# Patient Record
Sex: MALE | Race: WHITE | NOT HISPANIC OR LATINO | Employment: FULL TIME | URBAN - METROPOLITAN AREA
[De-identification: names, ages, dates, MRNs, and addresses within clinical notes are randomized per-mention and may not be internally consistent; named-entity substitution may affect disease eponyms.]

---

## 2023-10-03 ENCOUNTER — TRANSCRIBE ORDERS (OUTPATIENT)
Dept: CARDIOLOGY | Facility: CLINIC | Age: 58
End: 2023-10-03
Payer: COMMERCIAL

## 2023-10-03 DIAGNOSIS — I73.9 PAD (PERIPHERAL ARTERY DISEASE) (CMS-HCC): Primary | ICD-10-CM

## 2023-10-20 ENCOUNTER — LAB (OUTPATIENT)
Dept: LAB | Facility: LAB | Age: 58
End: 2023-10-20
Payer: COMMERCIAL

## 2023-10-20 DIAGNOSIS — I73.9 PAD (PERIPHERAL ARTERY DISEASE) (CMS-HCC): ICD-10-CM

## 2023-10-20 PROCEDURE — 80048 BASIC METABOLIC PNL TOTAL CA: CPT

## 2023-10-20 PROCEDURE — 36415 COLL VENOUS BLD VENIPUNCTURE: CPT

## 2023-10-21 LAB
ANION GAP SERPL CALC-SCNC: 11 MMOL/L (ref 10–20)
BUN SERPL-MCNC: 26 MG/DL (ref 6–23)
CALCIUM SERPL-MCNC: 9.6 MG/DL (ref 8.6–10.6)
CHLORIDE SERPL-SCNC: 104 MMOL/L (ref 98–107)
CO2 SERPL-SCNC: 27 MMOL/L (ref 21–32)
CREAT SERPL-MCNC: 1.01 MG/DL (ref 0.5–1.3)
GFR SERPL CREATININE-BSD FRML MDRD: 85 ML/MIN/1.73M*2
GLUCOSE SERPL-MCNC: 85 MG/DL (ref 74–99)
POTASSIUM SERPL-SCNC: 4.1 MMOL/L (ref 3.5–5.3)
SODIUM SERPL-SCNC: 138 MMOL/L (ref 136–145)

## 2023-11-03 ENCOUNTER — APPOINTMENT (OUTPATIENT)
Dept: RADIOLOGY | Facility: CLINIC | Age: 58
End: 2023-11-03
Payer: COMMERCIAL

## 2023-11-07 ENCOUNTER — APPOINTMENT (OUTPATIENT)
Dept: CARDIOLOGY | Facility: CLINIC | Age: 58
End: 2023-11-07
Payer: COMMERCIAL

## 2023-11-10 ENCOUNTER — ANCILLARY PROCEDURE (OUTPATIENT)
Dept: RADIOLOGY | Facility: CLINIC | Age: 58
End: 2023-11-10
Payer: COMMERCIAL

## 2023-11-10 DIAGNOSIS — I71.00 DISSECTION OF UNSPECIFIED SITE OF AORTA (MULTI): ICD-10-CM

## 2023-11-10 PROCEDURE — 2550000001 HC RX 255 CONTRASTS: Performed by: INTERNAL MEDICINE

## 2023-11-10 PROCEDURE — 74174 CTA ABD&PLVS W/CONTRAST: CPT | Performed by: STUDENT IN AN ORGANIZED HEALTH CARE EDUCATION/TRAINING PROGRAM

## 2023-11-10 PROCEDURE — 71275 CT ANGIOGRAPHY CHEST: CPT

## 2023-11-10 PROCEDURE — 71275 CT ANGIOGRAPHY CHEST: CPT | Performed by: STUDENT IN AN ORGANIZED HEALTH CARE EDUCATION/TRAINING PROGRAM

## 2023-11-10 RX ADMIN — IOHEXOL 70 ML: 350 INJECTION, SOLUTION INTRAVENOUS at 13:19

## 2023-11-12 PROBLEM — I69.351 HEMIPLEGIA AND HEMIPARESIS FOLLOWING CEREBRAL INFARCTION AFFECTING RIGHT DOMINANT SIDE (MULTI): Status: ACTIVE | Noted: 2022-11-11

## 2023-11-12 PROBLEM — E83.42 HYPOMAGNESEMIA: Status: ACTIVE | Noted: 2022-11-11

## 2023-11-12 PROBLEM — I82.C11: Status: ACTIVE | Noted: 2022-11-11

## 2023-11-12 PROBLEM — R20.0 RIGHT LEG NUMBNESS: Status: ACTIVE | Noted: 2023-11-12

## 2023-11-12 PROBLEM — I10 HYPERTENSION: Status: ACTIVE | Noted: 2018-09-18

## 2023-11-12 PROBLEM — B35.1 DERMATOPHYTOSIS OF NAIL: Status: ACTIVE | Noted: 2018-09-18

## 2023-11-12 PROBLEM — I77.71 CAROTID ARTERY DISSECTION (MULTI): Status: ACTIVE | Noted: 2023-11-12

## 2023-11-12 PROBLEM — M62.81 MUSCLE WEAKNESS (GENERALIZED): Status: ACTIVE | Noted: 2022-11-11

## 2023-11-12 PROBLEM — I71.010: Status: ACTIVE | Noted: 2023-02-09

## 2023-11-12 PROBLEM — K21.9 GASTRO-ESOPHAGEAL REFLUX DISEASE WITHOUT ESOPHAGITIS: Status: ACTIVE | Noted: 2022-11-11

## 2023-11-12 PROBLEM — R48.8 OTHER SYMBOLIC DYSFUNCTIONS: Status: ACTIVE | Noted: 2022-11-11

## 2023-11-12 PROBLEM — Z95.828 S/P ASCENDING AORTIC REPLACEMENT: Status: ACTIVE | Noted: 2023-11-12

## 2023-11-12 PROBLEM — I63.40 CEREBROVASCULAR ACCIDENT (CVA) DUE TO EMBOLISM OF CEREBRAL ARTERY (MULTI): Status: ACTIVE | Noted: 2023-02-09

## 2023-11-12 PROBLEM — M79.89 OTHER SPECIFIED SOFT TISSUE DISORDERS: Status: ACTIVE | Noted: 2022-11-11

## 2023-11-12 PROBLEM — R26.2 DIFFICULTY IN WALKING, NOT ELSEWHERE CLASSIFIED: Status: ACTIVE | Noted: 2022-11-11

## 2023-11-12 PROBLEM — I48.91 A-FIB (MULTI): Status: ACTIVE | Noted: 2023-11-12

## 2023-11-12 PROBLEM — J98.6 DISORDERS OF DIAPHRAGM: Status: ACTIVE | Noted: 2022-11-11

## 2023-11-12 PROBLEM — M75.42 SHOULDER IMPINGEMENT SYNDROME, LEFT: Status: ACTIVE | Noted: 2021-11-22

## 2023-11-12 PROBLEM — E78.5 HYPERLIPIDEMIA, UNSPECIFIED: Status: ACTIVE | Noted: 2022-11-11

## 2023-11-12 RX ORDER — METOPROLOL TARTRATE 25 MG/1
25 TABLET, FILM COATED ORAL EVERY 12 HOURS
COMMUNITY
End: 2023-12-20 | Stop reason: ALTCHOICE

## 2023-11-12 RX ORDER — AMLODIPINE BESYLATE 10 MG/1
10 TABLET ORAL NIGHTLY
COMMUNITY

## 2023-11-12 RX ORDER — LIDOCAINE 50 MG/G
1 PATCH TOPICAL
COMMUNITY
End: 2023-12-20 | Stop reason: ALTCHOICE

## 2023-11-12 RX ORDER — MELATONIN 5 MG
CAPSULE ORAL
COMMUNITY
End: 2023-12-20 | Stop reason: ALTCHOICE

## 2023-11-12 RX ORDER — POLYETHYLENE GLYCOL 3350 17 G/17G
17 POWDER, FOR SOLUTION ORAL DAILY PRN
COMMUNITY
Start: 2022-11-04 | End: 2023-12-20 | Stop reason: ALTCHOICE

## 2023-11-12 RX ORDER — BISOPROLOL FUMARATE 10 MG/1
10 TABLET, FILM COATED ORAL DAILY
COMMUNITY

## 2023-11-12 RX ORDER — PANTOPRAZOLE SODIUM 40 MG/1
40 TABLET, DELAYED RELEASE ORAL
COMMUNITY
Start: 2023-01-03 | End: 2023-12-20 | Stop reason: ALTCHOICE

## 2023-11-12 RX ORDER — AMLODIPINE BESYLATE 5 MG/1
5 TABLET ORAL
COMMUNITY
Start: 2022-02-25 | End: 2023-12-20 | Stop reason: ALTCHOICE

## 2023-11-12 RX ORDER — ATORVASTATIN CALCIUM 40 MG/1
40 TABLET, FILM COATED ORAL NIGHTLY
COMMUNITY
End: 2023-12-20 | Stop reason: ALTCHOICE

## 2023-11-12 RX ORDER — ASPIRIN 81 MG/1
1 TABLET ORAL DAILY
COMMUNITY
Start: 2022-11-04

## 2023-11-12 RX ORDER — ACETAMINOPHEN, DIPHENHYDRAMINE HCL, PHENYLEPHRINE HCL 325; 25; 5 MG/1; MG/1; MG/1
1 TABLET ORAL DAILY
COMMUNITY
Start: 2022-11-04 | End: 2023-12-20 | Stop reason: ALTCHOICE

## 2023-11-12 RX ORDER — MULTIVIT-MIN/IRON FUM/FOLIC AC 7.5 MG-4
1 TABLET ORAL DAILY
COMMUNITY
Start: 2022-11-29

## 2023-11-12 RX ORDER — ACETAMINOPHEN 325 MG/1
1-2 TABLET ORAL EVERY 6 HOURS PRN
COMMUNITY
Start: 2022-11-04 | End: 2023-12-20 | Stop reason: ALTCHOICE

## 2023-11-12 RX ORDER — LANOLIN ALCOHOL/MO/W.PET/CERES
400 CREAM (GRAM) TOPICAL
COMMUNITY
End: 2023-12-20 | Stop reason: ALTCHOICE

## 2023-11-12 RX ORDER — ATORVASTATIN CALCIUM 20 MG/1
20 TABLET, FILM COATED ORAL
COMMUNITY
Start: 2023-08-04

## 2023-11-13 NOTE — PROGRESS NOTES
Subjective   Patient ID: Gadiel Salcedo is a 58 y.o. male who presents for Follow-up (CTA review).    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 His recent CTA of chest/abd/pelvis was unremarkable. MRI of the spine showed degenerative changes,but otherwise unremarkable.      He was on Xarelto 20mg p.o BID along with baby aspirin. Xarelto was discontinued by his cardiac surgeon and now he is on baby aspirin only.      Patient kidney function is intact, patient denies any history of abdominal angina, no history of claudication yet he reports leg pain and numbness in both lower extremities sometimes, patient denies any history of lateralization no history of focal neurological signs or symptoms, patient's blood pressure and pulse is to goal. He is on Bisoprolol 10mg p.o once a day now. No abdominal pain, his appetite is good. Denies any leg pain and gait problems. No hx of bleeding in the past.     PVR: No evidence of arterial occlusive disease in the bilateral lower extremity at rest and with exercise. Normal digital perfusion noted. Triphasic flow noted.    Review of Systems   Constitutional: Negative.    HENT: Negative.     Eyes: Negative.    Respiratory: Negative.     Cardiovascular:  Negative for leg swelling.   Gastrointestinal: Negative.    Endocrine: Negative.    Musculoskeletal:  Negative for gait problem.   Skin: Negative.    Allergic/Immunologic: Negative.    Neurological:  Positive for numbness.   Hematological: Negative.    Psychiatric/Behavioral: Negative.     All other systems reviewed and are negative.      Objective   /60   Pulse 56   Ht 1.829 m (6')   Wt 77.6 kg (171 lb)   BMI 23.19 kg/m²     Physical Exam  Vitals reviewed.   Constitutional:       Appearance: Normal appearance.   HENT:      Head: Normocephalic and  atraumatic.   Cardiovascular:      Rate and Rhythm: Normal rate and regular rhythm.      Pulses: Normal pulses.      Heart sounds: Normal heart sounds.   Pulmonary:      Effort: Pulmonary effort is normal.      Breath sounds: Normal breath sounds.   Skin:     General: Skin is warm.   Neurological:      General: No focal deficit present.      Mental Status: He is alert and oriented to person, place, and time.   Psychiatric:         Mood and Affect: Mood normal.         Behavior: Behavior normal.         Assessment/Plan   57 year old male with hx of carotid artery dissection secondary to left IJ clot, now s/p ascending aortic replacement following up with left ankle swelling, lower extremity numbness and weakness. Patient has discontinued Xarelto and is currently on baby aspirin and bisoprolol 10mg. Patient's blood pressure and pulse is to goal. PVR came back negative and CTA of chest/abd/pelvis is stable. Pt's sx have improved and he is doing well.      1- Discussed being on Plavix along with aspirin 81mg. Patient will discuss this with his neurologist.,  Patient is to continue with the same current medications patient reduced his atorvastatin dose from 40 to 20 mg because he feels that this is too much and he likes to be on lesser amount and dosage of medications as much as possible  2- Pt is encouraged to be more mobile and walk more.   3- We discussed vascular risk factor modifications: HbA1C <7%, LDL<70 and /80 or less which he is achieving a blood pressure of 126/65 and pulse of 56 bpm  4- patient is to report any signs or symptoms suggestive of abdominal angina, change in bowel habits, claudication or any other symptoms like abdominal pain, leg pain and back pain  5- Follow up in 6 months for re-assessment with CTA of chest/ abdomen and pelvis prior.     Scribe Attestation  By signing my name below, INoemi , Scribwing   attest that this documentation has been prepared under the direction and in  the presence of Daniel Brar MD.

## 2023-11-14 ENCOUNTER — OFFICE VISIT (OUTPATIENT)
Dept: CARDIOLOGY | Facility: CLINIC | Age: 58
End: 2023-11-14
Payer: COMMERCIAL

## 2023-11-14 VITALS
BODY MASS INDEX: 23.16 KG/M2 | SYSTOLIC BLOOD PRESSURE: 123 MMHG | HEIGHT: 72 IN | DIASTOLIC BLOOD PRESSURE: 60 MMHG | HEART RATE: 56 BPM | WEIGHT: 171 LBS

## 2023-11-14 DIAGNOSIS — Z95.828 S/P ASCENDING AORTIC REPLACEMENT: ICD-10-CM

## 2023-11-14 DIAGNOSIS — I82.C11 ACUTE EMBOLISM AND THROMBOSIS OF RIGHT INTERNAL JUGULAR VEIN (MULTI): ICD-10-CM

## 2023-11-14 DIAGNOSIS — I71.010 ASCENDING AORTIC DISSECTION (MULTI): Primary | ICD-10-CM

## 2023-11-14 PROCEDURE — 99214 OFFICE O/P EST MOD 30 MIN: CPT | Performed by: INTERNAL MEDICINE

## 2023-11-14 PROCEDURE — 3078F DIAST BP <80 MM HG: CPT | Performed by: INTERNAL MEDICINE

## 2023-11-14 PROCEDURE — 3074F SYST BP LT 130 MM HG: CPT | Performed by: INTERNAL MEDICINE

## 2023-11-14 ASSESSMENT — ENCOUNTER SYMPTOMS
CONSTITUTIONAL NEGATIVE: 1
RESPIRATORY NEGATIVE: 1
ALLERGIC/IMMUNOLOGIC NEGATIVE: 1
NUMBNESS: 1
ENDOCRINE NEGATIVE: 1
GASTROINTESTINAL NEGATIVE: 1
HEMATOLOGIC/LYMPHATIC NEGATIVE: 1
PSYCHIATRIC NEGATIVE: 1
EYES NEGATIVE: 1

## 2023-12-12 ENCOUNTER — TELEPHONE (OUTPATIENT)
Dept: NEUROLOGY | Facility: CLINIC | Age: 58
End: 2023-12-12
Payer: COMMERCIAL

## 2023-12-20 ENCOUNTER — OFFICE VISIT (OUTPATIENT)
Dept: NEUROLOGY | Facility: CLINIC | Age: 58
End: 2023-12-20
Payer: COMMERCIAL

## 2023-12-20 VITALS
HEART RATE: 52 BPM | DIASTOLIC BLOOD PRESSURE: 71 MMHG | WEIGHT: 176.4 LBS | HEIGHT: 71 IN | SYSTOLIC BLOOD PRESSURE: 142 MMHG | TEMPERATURE: 97.1 F | BODY MASS INDEX: 24.69 KG/M2

## 2023-12-20 DIAGNOSIS — Z86.73 HISTORY OF STROKE: Primary | ICD-10-CM

## 2023-12-20 DIAGNOSIS — G81.91 RIGHT HEMIPARESIS (MULTI): ICD-10-CM

## 2023-12-20 PROCEDURE — 3078F DIAST BP <80 MM HG: CPT | Performed by: PSYCHIATRY & NEUROLOGY

## 2023-12-20 PROCEDURE — 1036F TOBACCO NON-USER: CPT | Performed by: PSYCHIATRY & NEUROLOGY

## 2023-12-20 PROCEDURE — 99213 OFFICE O/P EST LOW 20 MIN: CPT | Performed by: PSYCHIATRY & NEUROLOGY

## 2023-12-20 PROCEDURE — 3077F SYST BP >= 140 MM HG: CPT | Performed by: PSYCHIATRY & NEUROLOGY

## 2023-12-20 ASSESSMENT — LIFESTYLE VARIABLES
HOW OFTEN DO YOU HAVE A DRINK CONTAINING ALCOHOL: 2-4 TIMES A MONTH
HOW MANY STANDARD DRINKS CONTAINING ALCOHOL DO YOU HAVE ON A TYPICAL DAY: 1 OR 2
HOW OFTEN DO YOU HAVE SIX OR MORE DRINKS ON ONE OCCASION: NEVER
SKIP TO QUESTIONS 9-10: 1
AUDIT-C TOTAL SCORE: 2

## 2023-12-20 ASSESSMENT — PATIENT HEALTH QUESTIONNAIRE - PHQ9
1. LITTLE INTEREST OR PLEASURE IN DOING THINGS: NOT AT ALL
2. FEELING DOWN, DEPRESSED OR HOPELESS: NOT AT ALL
SUM OF ALL RESPONSES TO PHQ9 QUESTIONS 1 & 2: 0

## 2023-12-20 NOTE — PROGRESS NOTES
Chief Complaint: Watershed infarct    HPI  58 y.o. male presenting for follow up regarding watershed infarct.    Since the last visit, he has been stable.  He does note an improvement in numbness.  It is now just in the right toes.  He has noted some nerve pain in the right toes characterized by a sharp pain in the toes.   He does note some curling of his right toes.      The patient denies any double vision, loss of peripheral vision, slurred speech, difficulty getting the words out, facial droop, facial numbness, focal weakness, focal numbness, loss of coordination, or loss of balance.            Current Outpatient Medications:     amLODIPine (Norvasc) 10 mg tablet, Take 1 tablet (10 mg) by mouth once daily at bedtime., Disp: , Rfl:     aspirin 81 mg EC tablet, Take 1 tablet (81 mg) by mouth once daily., Disp: , Rfl:     atorvastatin (Lipitor) 20 mg tablet, Take 1 tablet (20 mg) by mouth once daily., Disp: , Rfl:     bisoprolol (Zebeta) 10 mg tablet, Take 1 tablet (10 mg) by mouth once daily., Disp: , Rfl:     multivitamin with minerals (multivit-min-iron fum-folic ac) tablet, Take 1 tablet by mouth once daily., Disp: , Rfl:       Objective:  Gen: NAD  Neuro:  --HIF: A&O X 3, repetition and naming intact  --CN:  PERRLA, EOMI, VFF, no visible facial asymmetry, facial sensation intact, no tongue or palatal deviation, SCM intact  --Motor: Moves all 4 extremities equally except the following:      RLE: hip flexion 4+  --Sensory: Intact to light touch, intact to pinprick  --Reflex: 2+ symmetric, toes down  --Cerebellum: FTN and HTS intact  --Gait: Normal, narrow based gait    Relevant Results      Assessment:    Watershed Infarcts  Right Sided Hemiparesis    Plan:  = continue ASA 81 mg/day  - follow up in 6 months    Yaron Maxwell MD  [facility]  Department of Neurology

## 2024-05-03 DIAGNOSIS — I71.010 ASCENDING AORTIC DISSECTION (MULTI): Primary | ICD-10-CM

## 2024-05-10 ENCOUNTER — LAB (OUTPATIENT)
Dept: LAB | Facility: LAB | Age: 59
End: 2024-05-10
Payer: COMMERCIAL

## 2024-05-10 DIAGNOSIS — I71.010 ASCENDING AORTIC DISSECTION (MULTI): ICD-10-CM

## 2024-05-10 LAB
ALBUMIN SERPL BCP-MCNC: 4 G/DL (ref 3.4–5)
ALP SERPL-CCNC: 63 U/L (ref 33–136)
ALT SERPL W P-5'-P-CCNC: 31 U/L (ref 10–52)
ANION GAP SERPL CALC-SCNC: 10 MMOL/L (ref 10–20)
AST SERPL W P-5'-P-CCNC: 20 U/L (ref 9–39)
BASOPHILS # BLD AUTO: 0.04 X10*3/UL (ref 0–0.1)
BASOPHILS NFR BLD AUTO: 0.6 %
BILIRUB SERPL-MCNC: 0.7 MG/DL (ref 0–1.2)
BUN SERPL-MCNC: 16 MG/DL (ref 6–23)
CALCIUM SERPL-MCNC: 9.3 MG/DL (ref 8.6–10.6)
CHLORIDE SERPL-SCNC: 107 MMOL/L (ref 98–107)
CHOLEST SERPL-MCNC: 118 MG/DL (ref 0–199)
CHOLESTEROL/HDL RATIO: 2.3
CO2 SERPL-SCNC: 29 MMOL/L (ref 21–32)
CREAT SERPL-MCNC: 0.99 MG/DL (ref 0.5–1.3)
EGFRCR SERPLBLD CKD-EPI 2021: 87 ML/MIN/1.73M*2
EOSINOPHIL # BLD AUTO: 0.2 X10*3/UL (ref 0–0.7)
EOSINOPHIL NFR BLD AUTO: 3 %
ERYTHROCYTE [DISTWIDTH] IN BLOOD BY AUTOMATED COUNT: 12.6 % (ref 11.5–14.5)
GLUCOSE SERPL-MCNC: 96 MG/DL (ref 74–99)
HCT VFR BLD AUTO: 46.5 % (ref 41–52)
HDLC SERPL-MCNC: 50.5 MG/DL
HGB BLD-MCNC: 15.4 G/DL (ref 13.5–17.5)
IMM GRANULOCYTES # BLD AUTO: 0.02 X10*3/UL (ref 0–0.7)
IMM GRANULOCYTES NFR BLD AUTO: 0.3 % (ref 0–0.9)
LYMPHOCYTES # BLD AUTO: 1.69 X10*3/UL (ref 1.2–4.8)
LYMPHOCYTES NFR BLD AUTO: 25.3 %
MCH RBC QN AUTO: 30.8 PG (ref 26–34)
MCHC RBC AUTO-ENTMCNC: 33.1 G/DL (ref 32–36)
MCV RBC AUTO: 93 FL (ref 80–100)
MONOCYTES # BLD AUTO: 0.61 X10*3/UL (ref 0.1–1)
MONOCYTES NFR BLD AUTO: 9.1 %
NEUTROPHILS # BLD AUTO: 4.12 X10*3/UL (ref 1.2–7.7)
NEUTROPHILS NFR BLD AUTO: 61.7 %
NON-HDL CHOLESTEROL: 68 MG/DL (ref 0–149)
NRBC BLD-RTO: 0 /100 WBCS (ref 0–0)
PLATELET # BLD AUTO: 260 X10*3/UL (ref 150–450)
POTASSIUM SERPL-SCNC: 4.9 MMOL/L (ref 3.5–5.3)
PROT SERPL-MCNC: 7.1 G/DL (ref 6.4–8.2)
RBC # BLD AUTO: 5 X10*6/UL (ref 4.5–5.9)
SODIUM SERPL-SCNC: 141 MMOL/L (ref 136–145)
WBC # BLD AUTO: 6.7 X10*3/UL (ref 4.4–11.3)

## 2024-05-10 PROCEDURE — 83718 ASSAY OF LIPOPROTEIN: CPT

## 2024-05-10 PROCEDURE — 85025 COMPLETE CBC W/AUTO DIFF WBC: CPT

## 2024-05-10 PROCEDURE — 80053 COMPREHEN METABOLIC PANEL: CPT

## 2024-05-10 PROCEDURE — 36415 COLL VENOUS BLD VENIPUNCTURE: CPT

## 2024-05-10 PROCEDURE — 82465 ASSAY BLD/SERUM CHOLESTEROL: CPT

## 2024-05-14 ENCOUNTER — HOSPITAL ENCOUNTER (OUTPATIENT)
Dept: RADIOLOGY | Facility: CLINIC | Age: 59
Discharge: HOME | End: 2024-05-14
Payer: COMMERCIAL

## 2024-05-14 DIAGNOSIS — I71.010 ASCENDING AORTIC DISSECTION (MULTI): ICD-10-CM

## 2024-05-14 DIAGNOSIS — Z95.828 S/P ASCENDING AORTIC REPLACEMENT: ICD-10-CM

## 2024-05-14 DIAGNOSIS — I82.C11 ACUTE EMBOLISM AND THROMBOSIS OF RIGHT INTERNAL JUGULAR VEIN (MULTI): ICD-10-CM

## 2024-05-14 PROCEDURE — 71275 CT ANGIOGRAPHY CHEST: CPT

## 2024-05-14 PROCEDURE — 71275 CT ANGIOGRAPHY CHEST: CPT | Performed by: RADIOLOGY

## 2024-05-14 PROCEDURE — 2550000001 HC RX 255 CONTRASTS: Performed by: INTERNAL MEDICINE

## 2024-05-14 RX ADMIN — IOHEXOL 70 ML: 350 INJECTION, SOLUTION INTRAVENOUS at 10:12

## 2024-06-03 ASSESSMENT — ENCOUNTER SYMPTOMS
RESPIRATORY NEGATIVE: 1
HEMATOLOGIC/LYMPHATIC NEGATIVE: 1
GASTROINTESTINAL NEGATIVE: 1
CONSTITUTIONAL NEGATIVE: 1
PSYCHIATRIC NEGATIVE: 1
ENDOCRINE NEGATIVE: 1
NUMBNESS: 1
ALLERGIC/IMMUNOLOGIC NEGATIVE: 1
EYES NEGATIVE: 1

## 2024-06-03 NOTE — PROGRESS NOTES
Subjective   Patient ID: Gadiel Salcedo is a 58 y.o. 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 His recent CTA of chest/abd/pelvis was unremarkable. MRI of the spine showed degenerative changes,but otherwise unremarkable.      He was on Xarelto 20mg p.o BID along with baby aspirin. Xarelto was discontinued by his cardiac surgeon and now he is on baby aspirin only.      Patient kidney function is intact, patient denies any history of abdominal angina, no history of claudication yet he reports leg pain and numbness in both lower extremities sometimes, patient denies any history of lateralization no history of focal neurological signs or symptoms, patient's blood pressure and pulse is to goal. He is on Bisoprolol 10mg p.o once a day now. No abdominal pain, his appetite is good. Denies any leg pain and gait problems. No hx of bleeding in the past.     Patient denies chest pain, sob, abdominal pain, leg pain but does have ankle swelling worse at end of the day. He is compliant with compression stockings.     PVR: No evidence of arterial occlusive disease in the bilateral lower extremity at rest and with exercise. Normal digital perfusion noted. Triphasic flow noted.    Review of Systems   Constitutional: Negative.    HENT: Negative.     Eyes: Negative.    Respiratory: Negative.     Cardiovascular:  Negative for leg swelling.   Gastrointestinal: Negative.    Endocrine: Negative.    Musculoskeletal:  Negative for gait problem.   Skin: Negative.    Allergic/Immunologic: Negative.    Neurological:  Positive for numbness.   Hematological: Negative.    Psychiatric/Behavioral: Negative.     All other systems reviewed and are negative.      Objective   /67   Pulse 56   Ht 1.829 m (6')   Wt 83.2 kg (183 lb 6.4 oz)    SpO2 96%   BMI 24.87 kg/m²     Physical Exam  Vitals reviewed.   Constitutional:       Appearance: Normal appearance.   HENT:      Head: Normocephalic and atraumatic.   Cardiovascular:      Rate and Rhythm: Normal rate and regular rhythm.      Pulses: Normal pulses.      Heart sounds: Normal heart sounds.   Pulmonary:      Effort: Pulmonary effort is normal.      Breath sounds: Normal breath sounds.   Skin:     General: Skin is warm.   Neurological:      General: No focal deficit present.      Mental Status: He is alert and oriented to person, place, and time.   Psychiatric:         Mood and Affect: Mood normal.         Behavior: Behavior normal.         Assessment/Plan   58 year old male with hx of carotid artery dissection secondary to left IJ clot, now s/p ascending aortic replacement following up with left ankle swelling, lower extremity numbness and weakness. Patient has discontinued Xarelto and is currently on baby aspirin and bisoprolol 10mg. Patient's blood pressure and pulse is to goal. PVR came back negative and CTA of chest/abd/pelvis is stable. Pt's sx have improved and he is doing well. No chest pain, SOB, abdominal pain, leg pain. His labs are all normal, BP and pulse are to goal. CTA shows no extension of the dissection. Labs and studies reviewed with patient.      1- Continue with aspirin 81mg.   2- Pt is encouraged to be more mobile and walk more.   3- We discussed vascular risk factor modifications: HbA1C <7%, LDL<70 and /80 or less patient is currently at target in regards to blood pressure, pulse, LDL  4- patient is to report any signs or symptoms suggestive of abdominal angina, change in bowel habits, claudication or any other symptoms like abdominal pain, leg pain and back pain  5- Follow up in 1 year for re-assessment with CTA of chest/ abdomen and pelvis prior.     Scribe Attestation  By signing my name below, Noemi IRWIN Scribe   attest that this documentation has been  prepared under the direction and in the presence of Daniel Brar MD.

## 2024-06-04 ENCOUNTER — OFFICE VISIT (OUTPATIENT)
Dept: CARDIOLOGY | Facility: CLINIC | Age: 59
End: 2024-06-04
Payer: COMMERCIAL

## 2024-06-04 VITALS
HEART RATE: 56 BPM | BODY MASS INDEX: 24.84 KG/M2 | DIASTOLIC BLOOD PRESSURE: 67 MMHG | SYSTOLIC BLOOD PRESSURE: 123 MMHG | HEIGHT: 72 IN | OXYGEN SATURATION: 96 % | WEIGHT: 183.4 LBS

## 2024-06-04 DIAGNOSIS — I82.C11 ACUTE EMBOLISM AND THROMBOSIS OF RIGHT INTERNAL JUGULAR VEIN (MULTI): ICD-10-CM

## 2024-06-04 DIAGNOSIS — Z95.828 S/P ASCENDING AORTIC REPLACEMENT: ICD-10-CM

## 2024-06-04 DIAGNOSIS — I71.010 ASCENDING AORTIC DISSECTION (MULTI): Primary | ICD-10-CM

## 2024-06-04 PROCEDURE — 1036F TOBACCO NON-USER: CPT | Performed by: INTERNAL MEDICINE

## 2024-06-04 PROCEDURE — 99213 OFFICE O/P EST LOW 20 MIN: CPT | Performed by: INTERNAL MEDICINE

## 2024-06-04 PROCEDURE — 3074F SYST BP LT 130 MM HG: CPT | Performed by: INTERNAL MEDICINE

## 2024-06-04 PROCEDURE — 3078F DIAST BP <80 MM HG: CPT | Performed by: INTERNAL MEDICINE

## 2024-06-20 ENCOUNTER — APPOINTMENT (OUTPATIENT)
Dept: NEUROLOGY | Facility: CLINIC | Age: 59
End: 2024-06-20
Payer: COMMERCIAL

## 2024-06-28 DIAGNOSIS — I10 PRIMARY HYPERTENSION: Primary | ICD-10-CM

## 2024-06-28 RX ORDER — AMLODIPINE BESYLATE 10 MG/1
10 TABLET ORAL NIGHTLY
Qty: 90 TABLET | Refills: 3 | Status: SHIPPED | OUTPATIENT
Start: 2024-06-28

## 2024-08-16 DIAGNOSIS — I10 PRIMARY HYPERTENSION: Primary | ICD-10-CM

## 2024-08-19 RX ORDER — BISOPROLOL FUMARATE 10 MG/1
10 TABLET, FILM COATED ORAL DAILY
Qty: 90 TABLET | Refills: 3 | Status: SHIPPED | OUTPATIENT
Start: 2024-08-19

## 2025-04-25 ENCOUNTER — TELEPHONE (OUTPATIENT)
Dept: CARDIOLOGY | Facility: CLINIC | Age: 60
End: 2025-04-25
Payer: COMMERCIAL

## 2025-04-25 DIAGNOSIS — Z95.828 S/P ASCENDING AORTIC REPLACEMENT: Primary | ICD-10-CM

## 2025-04-28 NOTE — TELEPHONE ENCOUNTER
Discussed with Dr. Carey. Orders entered for lab work before office visit and to proceed with testing as previously ordered. Will send My Chart message regarding same. Orders pended for signature

## 2025-04-28 NOTE — TELEPHONE ENCOUNTER
TCT patient and explained that as a new patient, I will have to review his requests with Dr. Carey before submitting any orders. States understanding and requests communication through My Chart

## 2025-05-03 LAB
ALBUMIN SERPL-MCNC: 4.5 G/DL (ref 3.6–5.1)
ALP SERPL-CCNC: 62 U/L (ref 35–144)
ALT SERPL-CCNC: 34 U/L (ref 9–46)
ANION GAP SERPL CALCULATED.4IONS-SCNC: 7 MMOL/L (CALC) (ref 7–17)
AST SERPL-CCNC: 26 U/L (ref 10–35)
BILIRUB SERPL-MCNC: 0.7 MG/DL (ref 0.2–1.2)
BUN SERPL-MCNC: 18 MG/DL (ref 7–25)
CALCIUM SERPL-MCNC: 8.9 MG/DL (ref 8.6–10.3)
CHLORIDE SERPL-SCNC: 104 MMOL/L (ref 98–110)
CHOLEST SERPL-MCNC: 112 MG/DL
CHOLEST/HDLC SERPL: 2.3 (CALC)
CO2 SERPL-SCNC: 27 MMOL/L (ref 20–32)
CREAT SERPL-MCNC: 0.95 MG/DL (ref 0.7–1.35)
EGFRCR SERPLBLD CKD-EPI 2021: 91 ML/MIN/1.73M2
ERYTHROCYTE [DISTWIDTH] IN BLOOD BY AUTOMATED COUNT: 12.4 % (ref 11–15)
GLUCOSE SERPL-MCNC: 93 MG/DL (ref 65–99)
HCT VFR BLD AUTO: 48.7 % (ref 38.5–50)
HDLC SERPL-MCNC: 49 MG/DL
HGB BLD-MCNC: 16.2 G/DL (ref 13.2–17.1)
LDLC SERPL CALC-MCNC: 50 MG/DL (CALC)
MCH RBC QN AUTO: 31.3 PG (ref 27–33)
MCHC RBC AUTO-ENTMCNC: 33.3 G/DL (ref 32–36)
MCV RBC AUTO: 94 FL (ref 80–100)
NONHDLC SERPL-MCNC: 63 MG/DL (CALC)
PLATELET # BLD AUTO: 247 THOUSAND/UL (ref 140–400)
PMV BLD REES-ECKER: 10.7 FL (ref 7.5–12.5)
POTASSIUM SERPL-SCNC: 5.1 MMOL/L (ref 3.5–5.3)
PROT SERPL-MCNC: 7.4 G/DL (ref 6.1–8.1)
RBC # BLD AUTO: 5.18 MILLION/UL (ref 4.2–5.8)
SODIUM SERPL-SCNC: 138 MMOL/L (ref 135–146)
TRIGL SERPL-MCNC: 58 MG/DL
WBC # BLD AUTO: 5.5 THOUSAND/UL (ref 3.8–10.8)

## 2025-05-28 ENCOUNTER — HOSPITAL ENCOUNTER (OUTPATIENT)
Dept: RADIOLOGY | Facility: CLINIC | Age: 60
Discharge: HOME | End: 2025-05-28
Payer: COMMERCIAL

## 2025-05-28 DIAGNOSIS — Z95.828 S/P ASCENDING AORTIC REPLACEMENT: ICD-10-CM

## 2025-05-28 DIAGNOSIS — I82.C11 ACUTE EMBOLISM AND THROMBOSIS OF RIGHT INTERNAL JUGULAR VEIN (MULTI): ICD-10-CM

## 2025-05-28 DIAGNOSIS — I71.010 ASCENDING AORTIC DISSECTION (MULTI): ICD-10-CM

## 2025-05-28 PROCEDURE — 2550000001 HC RX 255 CONTRASTS

## 2025-05-28 PROCEDURE — 71275 CT ANGIOGRAPHY CHEST: CPT

## 2025-05-28 RX ADMIN — IOHEXOL 80 ML: 350 INJECTION, SOLUTION INTRAVENOUS at 09:13

## 2025-05-29 ENCOUNTER — APPOINTMENT (OUTPATIENT)
Dept: RADIOLOGY | Facility: CLINIC | Age: 60
End: 2025-05-29
Payer: COMMERCIAL

## 2025-05-30 ENCOUNTER — HOSPITAL ENCOUNTER (OUTPATIENT)
Dept: CARDIOLOGY | Facility: CLINIC | Age: 60
Discharge: HOME | End: 2025-05-30
Payer: COMMERCIAL

## 2025-05-30 DIAGNOSIS — I71.010 ASCENDING AORTIC DISSECTION (MULTI): ICD-10-CM

## 2025-05-30 PROCEDURE — 93306 TTE W/DOPPLER COMPLETE: CPT | Performed by: STUDENT IN AN ORGANIZED HEALTH CARE EDUCATION/TRAINING PROGRAM

## 2025-05-30 PROCEDURE — 93306 TTE W/DOPPLER COMPLETE: CPT

## 2025-06-01 LAB
AORTIC VALVE PEAK VELOCITY: 1.44 M/S
AV PEAK GRADIENT: 8 MMHG
AVA (PEAK VEL): 2.3 CM2
EJECTION FRACTION APICAL 4 CHAMBER: 62.1
EJECTION FRACTION: 61 %
LEFT ATRIUM VOLUME AREA LENGTH INDEX BSA: 37.4 ML/M2
LEFT VENTRICLE INTERNAL DIMENSION DIASTOLE: 4.61 CM (ref 3.5–6)
LEFT VENTRICULAR OUTFLOW TRACT DIAMETER: 1.95 CM
MITRAL VALVE E/A RATIO: 1.14
RIGHT VENTRICLE FREE WALL PEAK S': 10 CM/S
RIGHT VENTRICLE PEAK SYSTOLIC PRESSURE: 37 MMHG
TRICUSPID ANNULAR PLANE SYSTOLIC EXCURSION: 1.8 CM

## 2025-06-01 NOTE — PROGRESS NOTES
"Primary Care Physician: Radha Tao, APRN-CNP  Date of Visit: 2025  4:00 PM EDT      Chief Complaint:   Establish new care for h/o aortic dissection     HPI / Summary:   Sandeep Salcedo is a 59 y.o. 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    He does not recall much of the initial episode but on 10/25/2022 while working at [employer] as a  he had sudden collapse and was brought right over to the ER which showed type a dissection and urgently transferred downtown.    He reports overall the last 2-1/2 years feels pretty good.  Says energy level and fatigue not the same but he still able to complete a lot of home activities and ADLs    He does mention some occasional \"floaters\" and curtain like blurry vision that will last 5 to 10 seconds and occurs maybe about once a month.    Also admits to some anxiety and PTSD-like sensations with regards to what occurred to him.  Has been seeing therapist which has been helpful.      Last Cardiology Tests:  EC2025: Sinus bradycardia with HR 49 bpm, nonspecific ST changes    Echo:  2025:    1. Left ventricular ejection fraction is normal calculated by Lu's biplane at 61%.   2. Spectral Doppler shows a Grade I (impaired relaxation pattern) of left ventricular diastolic filling with normal left atrial filling pressure.   3. There is moderately increased posterior left ventricular wall thickness.   4. There is normal right ventricular global systolic function.   5. The left atrium is mildly dilated.   6. Mild to moderate mitral valve regurgitation.   7. The doppler estimated RVSP is mildly elevated with a right ventricular systolic pressure of 37 mmHg.   8. Mild to moderate aortic valve " regurgitation.      Cath:      Stress Test:    Cardiac Imaging:  CT aorta 5/2025:  IMPRESSION:  1. Status post ascending and nalini arch graft repair.  2. Stable residual dissection flap distal to the graft anastomosis  extending throughout the transverse and descending aorta. The  dissection flap extends into the proximal brachiocephalic, proximal  left common carotid, proximal left subclavian, and distally atleast 2  to the level of the renal artery (this is the extent of the field of  view). However review of prior CT performed 11/10/2023 suggest distal  extension to the proximal common iliacs and into the common femoral  arteries bilaterally. The findings overall remain unchanged relative  to prior CTA chest 05/14/2024 within the constraints of field-of-view    Past Medical History:  Medical History[1]     Past Surgical History:  Surgical History[2]       Social History:  He reports that he has never smoked. He has never used smokeless tobacco. He reports current alcohol use. He reports that he does not use drugs.    Family History:  family history is not on file.      Allergies:  RX Allergies[3]    Outpatient Medications:  Current Outpatient Medications   Medication Instructions    amLODIPine (NORVASC) 10 mg, oral, Nightly    aspirin 81 mg EC tablet 1 tablet, oral, Daily    atorvastatin (LIPITOR) 20 mg, oral, Daily RT    bisoprolol (ZEBETA) 10 mg, oral, Daily    multivitamin with minerals (multivit-min-iron fum-folic ac) tablet 1 tablet, oral, Daily       Review of Systems:  A complete 10 point ROS was performed and is negative except for HPI    Physical Exam:  GENERAL: alert, cooperative, pleasant, in no acute distress  SKIN: warm, dry, no rash.  NECK: no JVD, no NORMA  CARDIAC: Regular rate and rhythm with no rubs, murmurs, or gallops  CHEST: Normal respiratory efforts, lungs clear to auscultation bilaterally.  ABDOMEN: soft, nontender, nondistended  EXTREMITIES: no edema  NEURO: Alert and oriented x 3.  Grossly  normal.  Moves all 4 extremities.    Vitals:    06/02/25 1547   BP: 111/66   BP Location: Left arm   Patient Position: Sitting   Pulse: (!) 48   SpO2: 98%   Weight: 86.2 kg (190 lb)     Wt Readings from Last 5 Encounters:   06/04/24 83.2 kg (183 lb 6.4 oz)   12/20/23 80 kg (176 lb 6.4 oz)   11/14/23 77.6 kg (171 lb)   06/13/23 76.4 kg (168 lb 7 oz)   05/16/23 75.8 kg (167 lb 3 oz)     Body mass index is 25.77 kg/m².        Last Labs:  CMP:  Recent Labs     05/02/25  0838 05/10/24  0845 10/20/23  1620    141 138   K 5.1 4.9 4.1    107 104   CO2 27 29 27   ANIONGAP 7 10 11   BUN 18 16 26*   CREATININE 0.95 0.99 1.01   EGFR 91 87 85   GLUCOSE 93 96 85     Recent Labs     05/02/25  0838 05/10/24  0845 11/11/22  0809 11/02/22  0548 11/01/22  0008 10/27/22  1620 10/27/22  0249   ALBUMIN 4.5 4.0 3.2*   < > 3.5  3.5   < > 2.7*  2.7*   ALKPHOS 62 63  --   --  174*   < > 29*   ALT 34 31  --   --  70*   < > 31   AST 26 20  --   --  47*   < > 76*   BILITOT 0.7 0.7  --   --  1.1   < > 1.8*   LIPASE  --   --   --   --   --   --  11    < > = values in this interval not displayed.     CBC:  Recent Labs     05/02/25  0838 05/10/24  0845 11/11/22  0809   WBC 5.5 6.7 9.6   HGB 16.2 15.4 9.7*   HCT 48.7 46.5 33.3*    260 651*   MCV 94.0 93 103*     COAG:   Recent Labs     10/25/22  2048 10/25/22  1127   INR 1.4* 1.2*     ENDO:  Recent Labs     05/22/23  0839 10/26/22  0146 08/26/22  0742 08/26/21  0748   HGBA1C 5.1 5.2 5.6 5.7*      CARDIAC:   Recent Labs     10/25/22  1127   TROPHS 9     Recent Labs     05/02/25  0838 05/10/24  0845 10/26/22  0146   CHOL 112 118 91   LDLF  --   --  48   LDLCALC 50  --   --    HDL 49 50.5 25.5*   TRIG 58  --  89               Assessment/Plan   59 y.o. 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  BP and heart rate well-controlled and continue current medications including amlodipine 10 and bisoprolol.  Remains on aspirin and atorvastatin.  Recent echocardiogram reviewed showing normal LVEF and mild to moderate AI and MR.  He has had some slight blurry vision at times and what he describes as little bit of floaters.  Usually 1 or 2 times a month and lasting less than 10 seconds.  He has residual left carotid dissection and I will review with Ct surgery Dr. Reed and ask vascular surgery as well whether anything further needs to be done or just some serial follow-up.      Followup Appts:  Future Appointments   Date Time Provider Department Center   6/2/2025  4:00 PM Chris Carey DO [id number]           ____________________________________________________________  Chris Carey DO  [facility] [place]  [facility]         [1]   Past Medical History:  Diagnosis Date    Personal history of other diseases of the circulatory system     History of hypertension   [2]   Past Surgical History:  Procedure Laterality Date    OTHER SURGICAL HISTORY  12/28/2022    Ascending aortic dissection repair   [3]   Allergies  Allergen Reactions    Lisinopril Cough     Cough and decreased libido

## 2025-06-02 ENCOUNTER — APPOINTMENT (OUTPATIENT)
Dept: CARDIOLOGY | Facility: CLINIC | Age: 60
End: 2025-06-02
Payer: COMMERCIAL

## 2025-06-02 ENCOUNTER — OFFICE VISIT (OUTPATIENT)
Dept: CARDIOLOGY | Facility: CLINIC | Age: 60
End: 2025-06-02
Payer: COMMERCIAL

## 2025-06-02 VITALS
HEART RATE: 48 BPM | OXYGEN SATURATION: 98 % | DIASTOLIC BLOOD PRESSURE: 66 MMHG | SYSTOLIC BLOOD PRESSURE: 111 MMHG | BODY MASS INDEX: 25.77 KG/M2 | WEIGHT: 190 LBS

## 2025-06-02 DIAGNOSIS — I10 HYPERTENSION, UNSPECIFIED TYPE: Primary | ICD-10-CM

## 2025-06-02 DIAGNOSIS — Z95.828 S/P ASCENDING AORTIC REPLACEMENT: ICD-10-CM

## 2025-06-02 DIAGNOSIS — I77.71 CAROTID ARTERY DISSECTION (MULTI): ICD-10-CM

## 2025-06-02 DIAGNOSIS — I71.010 ASCENDING AORTIC DISSECTION (MULTI): ICD-10-CM

## 2025-06-02 LAB
ATRIAL RATE: 49 BPM
P AXIS: 73 DEGREES
P OFFSET: 205 MS
P ONSET: 143 MS
PR INTERVAL: 162 MS
Q ONSET: 224 MS
QRS COUNT: 8 BEATS
QRS DURATION: 80 MS
QT INTERVAL: 488 MS
QTC CALCULATION(BAZETT): 440 MS
QTC FREDERICIA: 456 MS
R AXIS: 0 DEGREES
T AXIS: 68 DEGREES
T OFFSET: 468 MS
VENTRICULAR RATE: 49 BPM

## 2025-06-02 PROCEDURE — 3078F DIAST BP <80 MM HG: CPT | Performed by: INTERNAL MEDICINE

## 2025-06-02 PROCEDURE — 99215 OFFICE O/P EST HI 40 MIN: CPT | Performed by: INTERNAL MEDICINE

## 2025-06-02 PROCEDURE — 1036F TOBACCO NON-USER: CPT | Performed by: INTERNAL MEDICINE

## 2025-06-02 PROCEDURE — 3074F SYST BP LT 130 MM HG: CPT | Performed by: INTERNAL MEDICINE

## 2025-06-02 PROCEDURE — 93005 ELECTROCARDIOGRAM TRACING: CPT | Performed by: INTERNAL MEDICINE

## 2025-06-02 ASSESSMENT — COLUMBIA-SUICIDE SEVERITY RATING SCALE - C-SSRS
2. HAVE YOU ACTUALLY HAD ANY THOUGHTS OF KILLING YOURSELF?: NO
6. HAVE YOU EVER DONE ANYTHING, STARTED TO DO ANYTHING, OR PREPARED TO DO ANYTHING TO END YOUR LIFE?: NO
1. IN THE PAST MONTH, HAVE YOU WISHED YOU WERE DEAD OR WISHED YOU COULD GO TO SLEEP AND NOT WAKE UP?: NO

## 2025-07-02 ENCOUNTER — OFFICE VISIT (OUTPATIENT)
Dept: VASCULAR SURGERY | Facility: CLINIC | Age: 60
End: 2025-07-02
Payer: COMMERCIAL

## 2025-07-02 VITALS
BODY MASS INDEX: 25.87 KG/M2 | DIASTOLIC BLOOD PRESSURE: 58 MMHG | SYSTOLIC BLOOD PRESSURE: 128 MMHG | HEART RATE: 54 BPM | RESPIRATION RATE: 18 BRPM | HEIGHT: 72 IN | WEIGHT: 191 LBS

## 2025-07-02 DIAGNOSIS — I71.03 DISSECTION OF THORACOABDOMINAL AORTA (MULTI): Primary | ICD-10-CM

## 2025-07-02 DIAGNOSIS — I77.71 CAROTID ARTERY DISSECTION (MULTI): ICD-10-CM

## 2025-07-02 PROCEDURE — 99214 OFFICE O/P EST MOD 30 MIN: CPT | Performed by: SURGERY

## 2025-07-02 PROCEDURE — 3008F BODY MASS INDEX DOCD: CPT | Performed by: SURGERY

## 2025-07-02 PROCEDURE — 3078F DIAST BP <80 MM HG: CPT | Performed by: SURGERY

## 2025-07-02 PROCEDURE — 99204 OFFICE O/P NEW MOD 45 MIN: CPT | Performed by: SURGERY

## 2025-07-02 PROCEDURE — 3074F SYST BP LT 130 MM HG: CPT | Performed by: SURGERY

## 2025-07-02 ASSESSMENT — ENCOUNTER SYMPTOMS
SHORTNESS OF BREATH: 0
OCCASIONAL FEELINGS OF UNSTEADINESS: 0
WEAKNESS: 0
ABDOMINAL PAIN: 0
UNEXPECTED WEIGHT CHANGE: 0
LOSS OF SENSATION IN FEET: 0
DEPRESSION: 0
NECK PAIN: 0
NUMBNESS: 0
BACK PAIN: 0
WOUND: 0

## 2025-07-02 ASSESSMENT — PATIENT HEALTH QUESTIONNAIRE - PHQ9
SUM OF ALL RESPONSES TO PHQ9 QUESTIONS 1 AND 2: 0
2. FEELING DOWN, DEPRESSED OR HOPELESS: NOT AT ALL
1. LITTLE INTEREST OR PLEASURE IN DOING THINGS: NOT AT ALL

## 2025-07-02 ASSESSMENT — COLUMBIA-SUICIDE SEVERITY RATING SCALE - C-SSRS
2. HAVE YOU ACTUALLY HAD ANY THOUGHTS OF KILLING YOURSELF?: NO
1. IN THE PAST MONTH, HAVE YOU WISHED YOU WERE DEAD OR WISHED YOU COULD GO TO SLEEP AND NOT WAKE UP?: NO
6. HAVE YOU EVER DONE ANYTHING, STARTED TO DO ANYTHING, OR PREPARED TO DO ANYTHING TO END YOUR LIFE?: NO

## 2025-07-02 NOTE — PROGRESS NOTES
Vascular Surgery Consult/Clinic Note    CC: Type A dissection    HPI:  Sandeep Salcedo is 59 y.o. male with history of type A dissection s/p ascending and nalini arch replacement with Dr. Reed who was also followed by Dr. 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 Pt had stroke at the time of the type A but since then he recovered.   No motor or sensory weakness or complaints.       Meds:   Medications Ordered Prior to Encounter[1]     Allergies:   RX Allergies[2]    SH:    Social Drivers of Health     Tobacco Use: Low Risk  (6/2/2025)    Patient History     Smoking Tobacco Use: Never     Smokeless Tobacco Use: Never     Passive Exposure: Not on file   Alcohol Use: Not At Risk (12/20/2023)    AUDIT-C     Frequency of Alcohol Consumption: 2-4 times a month     Average Number of Drinks: 1 or 2     Frequency of Binge Drinking: Never   Financial Resource Strain: Not on file   Food Insecurity: Not on file   Transportation Needs: Not on file   Physical Activity: Not on file   Stress: Not on file   Social Connections: Not on file   Intimate Partner Violence: Not on file   Depression: At risk (9/13/2024)    Received from [facility]    PHQ-2     PHQ-2 score: 4   Housing Stability: Not on file   Utilities: Not on file   Digital Equity: Not on file   Health Literacy: Not on file        FH:  Family History[3]     ROS:  Review of Systems   Constitutional:  Negative for unexpected weight change.   HENT:  Negative for congestion.    Respiratory:  Negative for shortness of breath.    Cardiovascular:  Negative for chest pain.   Gastrointestinal:  Negative for abdominal pain.   Musculoskeletal:  Negative for back pain and neck pain.   Skin:  Negative for wound.   Neurological:  Negative for weakness and numbness.        Objective:  Vitals:  There were no vitals filed for this visit.     Exam:  Gen: in NAD  GI: Soft, ND/NT  Ext:  B radial pulses 2+  BUE and BLE with 5/5 motor str.      Imaging:  Carotid duplex (2022) independently reviewed.   R CCA/ICA dissection.   L ICA patent without flow limiting stenosis.   B vert with antegrade flow    Carotid duplex (11/10/2022) independently reviewed.   R CCA/ICA dissection.   L ICA patent without flow limiting stenosis.   B vert with antegrade flow    Carotid duplex (10/26/2022) independently reviewed.   R CCA/ICA dissection.   L ICA patent without flow limiting stenosis.   B vert with antegrade flow    Last Cardiology Tests:  EC2025: Sinus bradycardia with HR 49 bpm, nonspecific ST changes     Echo:  2025:    1. Left ventricular ejection fraction is normal calculated by Lu's biplane at 61%.   2. Spectral Doppler shows a Grade I (impaired relaxation pattern) of left ventricular diastolic filling with normal left atrial filling pressure.   3. There is moderately increased posterior left ventricular wall thickness.   4. There is normal right ventricular global systolic function.   5. The left atrium is mildly dilated.   6. Mild to moderate mitral valve regurgitation.   7. The doppler estimated RVSP is mildly elevated with a right ventricular systolic pressure of 37 mmHg.   8. Mild to moderate aortic valve regurgitation.    Assessment & Plan:  Sandeep Salcedo is 59 y.o. male with history of type A dissection s/p ascending and nalini arch replacement   - Cont. ASA and statin therapy.    - There are no carotid images.    - Carotid duplex   - Return to the clinic with carotid duplex.    - Pt to follow up with Dr. Reed for a follow up visit.    Duke Monteiro MD, PhD  Clinical   [employer] [employer] [employer]  Co-Director, Aortic Center  [facility] [place]               [1]   Current Outpatient Medications on File Prior to Visit   Medication Sig Dispense Refill    amLODIPine (Norvasc) 10 mg tablet Take 1 tablet (10 mg) by mouth once daily at  bedtime. 90 tablet 3    aspirin 81 mg EC tablet Take 1 tablet (81 mg) by mouth once daily.      atorvastatin (Lipitor) 20 mg tablet Take 1 tablet (20 mg) by mouth once daily.      bisoprolol (Zebeta) 10 mg tablet Take 1 tablet (10 mg) by mouth once daily. 90 tablet 3    multivitamin with minerals (multivit-min-iron fum-folic ac) tablet Take 1 tablet by mouth once daily.       No current facility-administered medications on file prior to visit.   [2]   Allergies  Allergen Reactions    Lisinopril Cough     Cough and decreased libido   [3] No family history on file.

## 2025-07-11 ENCOUNTER — HOSPITAL ENCOUNTER (OUTPATIENT)
Dept: RADIOLOGY | Facility: CLINIC | Age: 60
Discharge: HOME | End: 2025-07-11
Payer: COMMERCIAL

## 2025-07-11 DIAGNOSIS — I71.03 DISSECTION OF THORACOABDOMINAL AORTA (MULTI): ICD-10-CM

## 2025-07-11 DIAGNOSIS — I77.71 CAROTID ARTERY DISSECTION (MULTI): ICD-10-CM

## 2025-07-11 PROCEDURE — 93880 EXTRACRANIAL BILAT STUDY: CPT

## 2025-07-29 PROBLEM — I71.019 DISSECTION OF THORACIC AORTA (MULTI): Status: ACTIVE | Noted: 2022-10-25

## 2025-07-29 PROBLEM — Z98.890 HISTORY OF MAJOR VASCULAR SURGERY: Status: ACTIVE | Noted: 2023-11-12

## 2025-07-29 PROBLEM — Z86.79 HISTORY OF HYPERTENSION: Status: ACTIVE | Noted: 2025-07-29

## 2025-07-29 PROBLEM — R48.9 SYMBOLIC DYSFUNCTION: Status: ACTIVE | Noted: 2022-11-11

## 2025-07-29 PROBLEM — E87.20 LACTIC ACIDOSIS: Status: RESOLVED | Noted: 2025-07-29 | Resolved: 2025-07-29

## 2025-07-29 PROBLEM — M79.9 DISORDER OF SOFT TISSUE: Status: ACTIVE | Noted: 2022-11-11

## 2025-07-29 PROBLEM — D69.6 LOW PLATELET COUNT: Status: RESOLVED | Noted: 2025-07-29 | Resolved: 2025-07-29

## 2025-07-29 PROBLEM — T17.500A MUCOID IMPACTION OF BRONCHI: Status: RESOLVED | Noted: 2025-07-29 | Resolved: 2025-07-29

## 2025-07-29 PROBLEM — I65.22 OCCLUSION OF LEFT CAROTID ARTERY: Status: RESOLVED | Noted: 2022-10-25 | Resolved: 2025-07-29

## 2025-07-29 PROBLEM — I69.359 UNILATERAL PARALYSIS AS LATE EFFECT OF CEREBROVASCULAR ACCIDENT (CVA) (MULTI): Status: RESOLVED | Noted: 2022-11-11 | Resolved: 2025-07-29

## 2025-07-29 PROBLEM — D62 ANEMIA DUE TO ACUTE BLOOD LOSS: Status: RESOLVED | Noted: 2025-07-29 | Resolved: 2025-07-29

## 2025-07-29 PROBLEM — D75.839 THROMBOCYTHEMIA: Status: RESOLVED | Noted: 2025-07-29 | Resolved: 2025-07-29

## 2025-07-29 PROBLEM — I63.9 CEREBROVASCULAR ACCIDENT (CVA) DUE TO EMBOLISM (MULTI): Status: ACTIVE | Noted: 2023-02-09

## 2025-07-29 PROBLEM — I82.409 DEEP VEIN THROMBOSIS (DVT) (MULTI): Status: ACTIVE | Noted: 2022-11-11

## 2025-07-29 PROBLEM — Z86.74 HISTORY OF CARDIAC ARREST: Status: RESOLVED | Noted: 2022-10-25 | Resolved: 2025-07-29

## 2025-07-29 PROBLEM — I82.C19: Status: RESOLVED | Noted: 2022-11-11 | Resolved: 2025-07-29

## 2025-07-29 NOTE — PROGRESS NOTES
Re-Referral from Dr. Monteiro. Gadiel returns for aorta follow up after recent ct 5/28/25, carotid us 7/11/25 status post carotid aortic dissection surgery 10/25/22 Root asc arch surgery 32 mm. Richa Zepeda RN

## 2025-08-06 ENCOUNTER — OFFICE VISIT (OUTPATIENT)
Dept: CARDIAC SURGERY | Facility: HOSPITAL | Age: 60
End: 2025-08-06
Payer: COMMERCIAL

## 2025-08-06 VITALS
BODY MASS INDEX: 26.26 KG/M2 | HEIGHT: 72 IN | DIASTOLIC BLOOD PRESSURE: 61 MMHG | HEART RATE: 51 BPM | WEIGHT: 193.9 LBS | OXYGEN SATURATION: 98 % | SYSTOLIC BLOOD PRESSURE: 135 MMHG

## 2025-08-06 DIAGNOSIS — Z95.828 S/P ASCENDING AORTIC REPLACEMENT: ICD-10-CM

## 2025-08-06 PROCEDURE — 99212 OFFICE O/P EST SF 10 MIN: CPT

## 2025-08-06 PROCEDURE — 3008F BODY MASS INDEX DOCD: CPT | Performed by: THORACIC SURGERY (CARDIOTHORACIC VASCULAR SURGERY)

## 2025-08-06 PROCEDURE — 3078F DIAST BP <80 MM HG: CPT | Performed by: THORACIC SURGERY (CARDIOTHORACIC VASCULAR SURGERY)

## 2025-08-06 PROCEDURE — 3075F SYST BP GE 130 - 139MM HG: CPT | Performed by: THORACIC SURGERY (CARDIOTHORACIC VASCULAR SURGERY)

## 2025-08-06 PROCEDURE — 99215 OFFICE O/P EST HI 40 MIN: CPT | Performed by: THORACIC SURGERY (CARDIOTHORACIC VASCULAR SURGERY)

## 2025-08-06 ASSESSMENT — PAIN SCALES - GENERAL: PAINLEVEL_OUTOF10: 0-NO PAIN

## 2025-09-03 ENCOUNTER — APPOINTMENT (OUTPATIENT)
Dept: VASCULAR SURGERY | Facility: CLINIC | Age: 60
End: 2025-09-03
Payer: COMMERCIAL

## 2025-09-03 ASSESSMENT — COLUMBIA-SUICIDE SEVERITY RATING SCALE - C-SSRS
1. IN THE PAST MONTH, HAVE YOU WISHED YOU WERE DEAD OR WISHED YOU COULD GO TO SLEEP AND NOT WAKE UP?: NO
6. HAVE YOU EVER DONE ANYTHING, STARTED TO DO ANYTHING, OR PREPARED TO DO ANYTHING TO END YOUR LIFE?: NO
2. HAVE YOU ACTUALLY HAD ANY THOUGHTS OF KILLING YOURSELF?: NO

## 2025-09-03 ASSESSMENT — PATIENT HEALTH QUESTIONNAIRE - PHQ9
1. LITTLE INTEREST OR PLEASURE IN DOING THINGS: NOT AT ALL
SUM OF ALL RESPONSES TO PHQ9 QUESTIONS 1 AND 2: 0
2. FEELING DOWN, DEPRESSED OR HOPELESS: NOT AT ALL

## 2025-09-03 ASSESSMENT — PAIN SCALES - GENERAL: PAINLEVEL_OUTOF10: 0-NO PAIN
